# Patient Record
Sex: FEMALE | Race: WHITE
[De-identification: names, ages, dates, MRNs, and addresses within clinical notes are randomized per-mention and may not be internally consistent; named-entity substitution may affect disease eponyms.]

---
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
## 2019-02-21 ENCOUNTER — HOSPITAL ENCOUNTER (EMERGENCY)
Dept: HOSPITAL 31 - C.ER | Age: 19
Discharge: HOME | End: 2019-02-21
Payer: MEDICAID

## 2019-02-21 VITALS
OXYGEN SATURATION: 100 % | RESPIRATION RATE: 18 BRPM | TEMPERATURE: 98.9 F | HEART RATE: 84 BPM | SYSTOLIC BLOOD PRESSURE: 108 MMHG | DIASTOLIC BLOOD PRESSURE: 67 MMHG

## 2019-02-21 VITALS — BODY MASS INDEX: 21.2 KG/M2

## 2019-02-21 DIAGNOSIS — S93.401A: Primary | ICD-10-CM

## 2019-02-21 DIAGNOSIS — S93.601A: ICD-10-CM
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
## 2019-02-22 NOTE — RAD
PROCEDURE:  Right Ankle Radiographs.



HISTORY:

 ankle injury 



COMPARISON:

None available.



FINDINGS:



BONES:

Irregularity of the mid inferior contour of the talus seen on lateral 

view presumably related to reported history of prior fracture. 



JOINTS:

No dislocation. 



SOFT TISSUES:

Soft tissue swelling.  No evidence of radiopaque foreign body. 



OTHER FINDINGS:

None.



IMPRESSION:

Soft tissue swelling.  



Irregularity of the mid inferior contour of the talus seen on lateral 

view presumably related to reported history of prior fracture.  

Correlate clinically.  Cross-sectional imaging may be considered if 

indicated.

## 2019-02-22 NOTE — RAD
Date of service: 



02/21/2019



PROCEDURE:  Radiographs of the right tibia and fibula.



HISTORY:

trauma today, pain



COMPARISON:

None available



TECHNIQUE:

Frontal and lateral views obtained.



FINDINGS:



BONES:

Bone alignment and mineralization are normal.  There is no acute 

displaced fracture or bone destruction.



JOINT SPACES:

Unremarkable.



OTHER FINDINGS:

None.



IMPRESSION:

No acute fracture or dislocation.

## 2019-02-22 NOTE — RAD
PROCEDURE:  Right foot radiographs.



HISTORY:

 trauma, foot pain 



COMPARISON:

None available.



FINDINGS:



BONES:

No acute displaced fracture. Questionable irregularity of the 

inferior mid talus demonstrated on ankle radiographs not appreciated 

on this examination. 



JOINTS:

No dislocation. 



SOFT TISSUES:

Soft tissue swelling.  No evidence of radiopaque foreign body. 



OTHER FINDINGS:

None.



IMPRESSION:

Soft tissue swelling. 



No acute displaced fracture, dislocation, or significant joint 

effusion identified.



Questionable irregularity of the inferior mid talus demonstrated on 

ankle radiographs not appreciated on this examination. 



If symptoms persist, or if there is continued clinical concern, x-ray 

follow-up in 7-10 days should be considered.